# Patient Record
Sex: FEMALE | Race: BLACK OR AFRICAN AMERICAN | NOT HISPANIC OR LATINO | URBAN - METROPOLITAN AREA
[De-identification: names, ages, dates, MRNs, and addresses within clinical notes are randomized per-mention and may not be internally consistent; named-entity substitution may affect disease eponyms.]

---

## 2023-12-06 ENCOUNTER — EMERGENCY (EMERGENCY)
Facility: HOSPITAL | Age: 78
LOS: 1 days | Discharge: HOME | End: 2023-12-06
Attending: EMERGENCY MEDICINE | Admitting: EMERGENCY MEDICINE
Payer: MEDICAID

## 2023-12-06 VITALS
SYSTOLIC BLOOD PRESSURE: 154 MMHG | TEMPERATURE: 98 F | DIASTOLIC BLOOD PRESSURE: 81 MMHG | HEART RATE: 60 BPM | RESPIRATION RATE: 15 BRPM | OXYGEN SATURATION: 100 %

## 2023-12-06 VITALS
RESPIRATION RATE: 16 BRPM | SYSTOLIC BLOOD PRESSURE: 132 MMHG | HEART RATE: 77 BPM | TEMPERATURE: 98 F | OXYGEN SATURATION: 100 % | DIASTOLIC BLOOD PRESSURE: 77 MMHG

## 2023-12-06 LAB
ALBUMIN SERPL ELPH-MCNC: 4.2 G/DL — SIGNIFICANT CHANGE UP (ref 3.3–5)
ALBUMIN SERPL ELPH-MCNC: 4.2 G/DL — SIGNIFICANT CHANGE UP (ref 3.3–5)
ALP SERPL-CCNC: 67 U/L — SIGNIFICANT CHANGE UP (ref 40–120)
ALP SERPL-CCNC: 67 U/L — SIGNIFICANT CHANGE UP (ref 40–120)
ALT FLD-CCNC: 23 U/L — SIGNIFICANT CHANGE UP (ref 4–33)
ALT FLD-CCNC: 23 U/L — SIGNIFICANT CHANGE UP (ref 4–33)
ANION GAP SERPL CALC-SCNC: 9 MMOL/L — SIGNIFICANT CHANGE UP (ref 7–14)
ANION GAP SERPL CALC-SCNC: 9 MMOL/L — SIGNIFICANT CHANGE UP (ref 7–14)
AST SERPL-CCNC: 20 U/L — SIGNIFICANT CHANGE UP (ref 4–32)
AST SERPL-CCNC: 20 U/L — SIGNIFICANT CHANGE UP (ref 4–32)
BASOPHILS # BLD AUTO: 0.01 K/UL — SIGNIFICANT CHANGE UP (ref 0–0.2)
BASOPHILS # BLD AUTO: 0.01 K/UL — SIGNIFICANT CHANGE UP (ref 0–0.2)
BASOPHILS NFR BLD AUTO: 0.2 % — SIGNIFICANT CHANGE UP (ref 0–2)
BASOPHILS NFR BLD AUTO: 0.2 % — SIGNIFICANT CHANGE UP (ref 0–2)
BILIRUB SERPL-MCNC: 0.3 MG/DL — SIGNIFICANT CHANGE UP (ref 0.2–1.2)
BILIRUB SERPL-MCNC: 0.3 MG/DL — SIGNIFICANT CHANGE UP (ref 0.2–1.2)
BUN SERPL-MCNC: 8 MG/DL — SIGNIFICANT CHANGE UP (ref 7–23)
BUN SERPL-MCNC: 8 MG/DL — SIGNIFICANT CHANGE UP (ref 7–23)
CALCIUM SERPL-MCNC: 9.2 MG/DL — SIGNIFICANT CHANGE UP (ref 8.4–10.5)
CALCIUM SERPL-MCNC: 9.2 MG/DL — SIGNIFICANT CHANGE UP (ref 8.4–10.5)
CHLORIDE SERPL-SCNC: 107 MMOL/L — SIGNIFICANT CHANGE UP (ref 98–107)
CHLORIDE SERPL-SCNC: 107 MMOL/L — SIGNIFICANT CHANGE UP (ref 98–107)
CO2 SERPL-SCNC: 27 MMOL/L — SIGNIFICANT CHANGE UP (ref 22–31)
CO2 SERPL-SCNC: 27 MMOL/L — SIGNIFICANT CHANGE UP (ref 22–31)
CREAT SERPL-MCNC: 0.65 MG/DL — SIGNIFICANT CHANGE UP (ref 0.5–1.3)
CREAT SERPL-MCNC: 0.65 MG/DL — SIGNIFICANT CHANGE UP (ref 0.5–1.3)
EGFR: 90 ML/MIN/1.73M2 — SIGNIFICANT CHANGE UP
EGFR: 90 ML/MIN/1.73M2 — SIGNIFICANT CHANGE UP
EOSINOPHIL # BLD AUTO: 0.06 K/UL — SIGNIFICANT CHANGE UP (ref 0–0.5)
EOSINOPHIL # BLD AUTO: 0.06 K/UL — SIGNIFICANT CHANGE UP (ref 0–0.5)
EOSINOPHIL NFR BLD AUTO: 1.3 % — SIGNIFICANT CHANGE UP (ref 0–6)
EOSINOPHIL NFR BLD AUTO: 1.3 % — SIGNIFICANT CHANGE UP (ref 0–6)
GLUCOSE SERPL-MCNC: 93 MG/DL — SIGNIFICANT CHANGE UP (ref 70–99)
GLUCOSE SERPL-MCNC: 93 MG/DL — SIGNIFICANT CHANGE UP (ref 70–99)
HCT VFR BLD CALC: 36.8 % — SIGNIFICANT CHANGE UP (ref 34.5–45)
HCT VFR BLD CALC: 36.8 % — SIGNIFICANT CHANGE UP (ref 34.5–45)
HGB BLD-MCNC: 11 G/DL — LOW (ref 11.5–15.5)
HGB BLD-MCNC: 11 G/DL — LOW (ref 11.5–15.5)
IANC: 2.58 K/UL — SIGNIFICANT CHANGE UP (ref 1.8–7.4)
IANC: 2.58 K/UL — SIGNIFICANT CHANGE UP (ref 1.8–7.4)
IMM GRANULOCYTES NFR BLD AUTO: 0.4 % — SIGNIFICANT CHANGE UP (ref 0–0.9)
IMM GRANULOCYTES NFR BLD AUTO: 0.4 % — SIGNIFICANT CHANGE UP (ref 0–0.9)
LYMPHOCYTES # BLD AUTO: 1.72 K/UL — SIGNIFICANT CHANGE UP (ref 1–3.3)
LYMPHOCYTES # BLD AUTO: 1.72 K/UL — SIGNIFICANT CHANGE UP (ref 1–3.3)
LYMPHOCYTES # BLD AUTO: 36.8 % — SIGNIFICANT CHANGE UP (ref 13–44)
LYMPHOCYTES # BLD AUTO: 36.8 % — SIGNIFICANT CHANGE UP (ref 13–44)
MCHC RBC-ENTMCNC: 22.9 PG — LOW (ref 27–34)
MCHC RBC-ENTMCNC: 22.9 PG — LOW (ref 27–34)
MCHC RBC-ENTMCNC: 29.9 GM/DL — LOW (ref 32–36)
MCHC RBC-ENTMCNC: 29.9 GM/DL — LOW (ref 32–36)
MCV RBC AUTO: 76.5 FL — LOW (ref 80–100)
MCV RBC AUTO: 76.5 FL — LOW (ref 80–100)
MONOCYTES # BLD AUTO: 0.28 K/UL — SIGNIFICANT CHANGE UP (ref 0–0.9)
MONOCYTES # BLD AUTO: 0.28 K/UL — SIGNIFICANT CHANGE UP (ref 0–0.9)
MONOCYTES NFR BLD AUTO: 6 % — SIGNIFICANT CHANGE UP (ref 2–14)
MONOCYTES NFR BLD AUTO: 6 % — SIGNIFICANT CHANGE UP (ref 2–14)
NEUTROPHILS # BLD AUTO: 2.58 K/UL — SIGNIFICANT CHANGE UP (ref 1.8–7.4)
NEUTROPHILS # BLD AUTO: 2.58 K/UL — SIGNIFICANT CHANGE UP (ref 1.8–7.4)
NEUTROPHILS NFR BLD AUTO: 55.3 % — SIGNIFICANT CHANGE UP (ref 43–77)
NEUTROPHILS NFR BLD AUTO: 55.3 % — SIGNIFICANT CHANGE UP (ref 43–77)
NRBC # BLD: 0 /100 WBCS — SIGNIFICANT CHANGE UP (ref 0–0)
NRBC # BLD: 0 /100 WBCS — SIGNIFICANT CHANGE UP (ref 0–0)
NRBC # FLD: 0 K/UL — SIGNIFICANT CHANGE UP (ref 0–0)
NRBC # FLD: 0 K/UL — SIGNIFICANT CHANGE UP (ref 0–0)
PLATELET # BLD AUTO: 216 K/UL — SIGNIFICANT CHANGE UP (ref 150–400)
PLATELET # BLD AUTO: 216 K/UL — SIGNIFICANT CHANGE UP (ref 150–400)
POTASSIUM SERPL-MCNC: 4.2 MMOL/L — SIGNIFICANT CHANGE UP (ref 3.5–5.3)
POTASSIUM SERPL-MCNC: 4.2 MMOL/L — SIGNIFICANT CHANGE UP (ref 3.5–5.3)
POTASSIUM SERPL-SCNC: 4.2 MMOL/L — SIGNIFICANT CHANGE UP (ref 3.5–5.3)
POTASSIUM SERPL-SCNC: 4.2 MMOL/L — SIGNIFICANT CHANGE UP (ref 3.5–5.3)
PROT SERPL-MCNC: 7 G/DL — SIGNIFICANT CHANGE UP (ref 6–8.3)
PROT SERPL-MCNC: 7 G/DL — SIGNIFICANT CHANGE UP (ref 6–8.3)
RBC # BLD: 4.81 M/UL — SIGNIFICANT CHANGE UP (ref 3.8–5.2)
RBC # BLD: 4.81 M/UL — SIGNIFICANT CHANGE UP (ref 3.8–5.2)
RBC # FLD: 14.7 % — HIGH (ref 10.3–14.5)
RBC # FLD: 14.7 % — HIGH (ref 10.3–14.5)
SODIUM SERPL-SCNC: 143 MMOL/L — SIGNIFICANT CHANGE UP (ref 135–145)
SODIUM SERPL-SCNC: 143 MMOL/L — SIGNIFICANT CHANGE UP (ref 135–145)
TSH SERPL-MCNC: 0.71 UIU/ML — SIGNIFICANT CHANGE UP (ref 0.27–4.2)
TSH SERPL-MCNC: 0.71 UIU/ML — SIGNIFICANT CHANGE UP (ref 0.27–4.2)
WBC # BLD: 4.67 K/UL — SIGNIFICANT CHANGE UP (ref 3.8–10.5)
WBC # BLD: 4.67 K/UL — SIGNIFICANT CHANGE UP (ref 3.8–10.5)
WBC # FLD AUTO: 4.67 K/UL — SIGNIFICANT CHANGE UP (ref 3.8–10.5)
WBC # FLD AUTO: 4.67 K/UL — SIGNIFICANT CHANGE UP (ref 3.8–10.5)

## 2023-12-06 PROCEDURE — 71046 X-RAY EXAM CHEST 2 VIEWS: CPT | Mod: 26

## 2023-12-06 PROCEDURE — 93010 ELECTROCARDIOGRAM REPORT: CPT

## 2023-12-06 PROCEDURE — 99284 EMERGENCY DEPT VISIT MOD MDM: CPT

## 2023-12-06 RX ORDER — METOCLOPRAMIDE HCL 10 MG
10 TABLET ORAL ONCE
Refills: 0 | Status: COMPLETED | OUTPATIENT
Start: 2023-12-06 | End: 2023-12-06

## 2023-12-06 RX ORDER — MECLIZINE HCL 12.5 MG
1 TABLET ORAL
Qty: 20 | Refills: 0
Start: 2023-12-06 | End: 2023-12-10

## 2023-12-06 RX ADMIN — Medication 10 MILLIGRAM(S): at 16:31

## 2023-12-06 NOTE — ED ADULT NURSE NOTE - NSFALLRISKINTERV_ED_ALL_ED
Assistance OOB with selected safe patient handling equipment if applicable/Assistance with ambulation/Communicate fall risk and risk factors to all staff, patient, and family/Monitor gait and stability/Provide visual cue: yellow wristband, yellow gown, etc/Reinforce activity limits and safety measures with patient and family/Call bell, personal items and telephone in reach/Instruct patient to call for assistance before getting out of bed/chair/stretcher/Non-slip footwear applied when patient is off stretcher/[place] to call system/Physically safe environment - no spills, clutter or unnecessary equipment/Purposeful Proactive Rounding/Room/bathroom lighting operational, light cord in reach Assistance OOB with selected safe patient handling equipment if applicable/Assistance with ambulation/Communicate fall risk and risk factors to all staff, patient, and family/Monitor gait and stability/Provide visual cue: yellow wristband, yellow gown, etc/Reinforce activity limits and safety measures with patient and family/Call bell, personal items and telephone in reach/Instruct patient to call for assistance before getting out of bed/chair/stretcher/Non-slip footwear applied when patient is off stretcher/[place] to call system/Physically safe environment - no spills, clutter or unnecessary equipment/Purposeful Proactive Rounding/Room/bathroom lighting operational, light cord in reach

## 2023-12-06 NOTE — ED PROVIDER NOTE - OBJECTIVE STATEMENT
79 y/o with pmh of htn, anemia, hld presents for dizziness since 12am. daughter at bedside. Creole  used. Patient states last night she was sleeping, and around 12 started having back pain. She took ibuprofen and then started to feel dizzy. she checked her BP and saw it was 160/90s. She took her BP medication, but vomited right after. She had two episodes of vomit since onset of symptoms. Per patient's daughter, she is not compliant with her BP meds. She does states she typically vomits after taking her BP medication. She states she is unable to ambulate because she feels unbalanced, and the room is spinning. At this moment she is not endorsing any headache or nausea but is endorsing dizziness. 77 y/o with pmh of htn, anemia, hld presents for dizziness since 12am. daughter at bedside. Creole  used. Patient states last night she was sleeping, and around 12 started having back pain. She took ibuprofen and then started to feel dizzy. she checked her BP and saw it was 160/90s. She took her BP medication, but vomited right after. She had two episodes of vomit since onset of symptoms. Per patient's daughter, she is not compliant with her BP meds. She does states she typically vomits after taking her BP medication. She states she is unable to ambulate because she feels unbalanced, and the room is spinning. At this moment she is not endorsing any headache or nausea but is endorsing dizziness.

## 2023-12-06 NOTE — ED ADULT NURSE NOTE - OBJECTIVE STATEMENT
Patient presents to ED for dizziness, "room spinning." She is A&Ox4, in no acute distress, calm, cooperative. She states episode began last night and worsens when she goes from lying to standing. Per triage, evaluated for stroke and no stroke code called. Denies CP, SOB, dyspnea, fevers. Respirations even, unlabored on room air. History of HTN, HLD. 20G IV placed right AC. MD at bedside evaluating patient.

## 2023-12-06 NOTE — ED PROVIDER NOTE - PHYSICAL EXAMINATION
Physical Exam:  Gen:  Well appearing, awake, alert, non-toxic appearing  Head: NCAT  HEENT: Normal conjunctiva, trachea midline, moist mucous membranes  Lung: CTAB, no respiratory distress, no wheezes/rhonchi/rales B/L, speaking in full sentences  CV: RRR, no murmurs, rubs or gallops  Abd: Soft, non-tender, non-distended, no guarding, rigidity, rebound tenderness, or CVA tenderness   MSK: No visible deformities, moves all four extremities, no muscle or joint tenderness  Neuro: No focal motor deficits  Skin: Warm, well perfused, no visible rashes, no leg swelling  Psych: appropriate affect and mood Physical Exam:  Gen:  Well appearing, awake, alert, non-toxic appearing  Head: NCAT  HEENT: cn4-12 intact. on cranial nerve 3 exam, there is left lateral gaze.   Lung: CTAB, no respiratory distress, no wheezes/rhonchi/rales B/L, speaking in full sentences  CV: RRR, no murmurs, rubs or gallops  Abd: Soft, non-tender, non-distended, no guarding, rigidity, rebound tenderness, or CVA tenderness   MSK: No visible deformities, moves all four extremities, no muscle or joint tenderness  Neuro: No focal motor deficits  Skin: Warm, well perfused, no visible rashes, no leg swelling  Psych: appropriate affect and mood

## 2023-12-06 NOTE — ED ADULT NURSE NOTE - NS_SISCREENINGSR_GEN_ALL_ED
Patient is requesting a trop test and diabetes check .     [facility]      Negative

## 2023-12-06 NOTE — ED ADULT TRIAGE NOTE - CHIEF COMPLAINT QUOTE
pt aox4, ambulatory with stand by assist, creole speaking- daughter Alexandria, cecelia, comes in for sudden onset dizziness at approx 1230am last night, no falls. no other neuro deficits on exam. denies headache, no vision changes, no chest pain, denies recent illness. Hx: HTN, HLD pt aox4, ambulatory with stand by assist, creole speaking- daughter Alexandria, cecelia, comes in for sudden onset dizziness at approx 1230am last night, no falls. no other neuro deficits on exam. denies headache, no vision changes, no chest pain, denies recent illness. Hx: HTN, HLD  no code stroke per Dr. Rose

## 2023-12-06 NOTE — ED PROVIDER NOTE - PATIENT PORTAL LINK FT
You can access the FollowMyHealth Patient Portal offered by [facility] by registering at the following website: [email or web address]. By joining [employer]’s FollowMyHealth portal, you will also be able to view your health information using other applications (apps) compatible with our system. You can access the FollowMyHealth Patient Portal offered by [facility] by registering at the following website: [email or web address]. By joining [employer]’s FollowMyHealth portal, you will also be able to view your health information using other applications (apps) compatible with our system.

## 2023-12-06 NOTE — ED PROVIDER NOTE - ATTENDING CONTRIBUTION TO CARE
78 female to ED with daughter complaining of episodes of dizziness since last night.  No recent illness, no fever/chills, no cough/sore throat.  Denies associated palpitations, no CP/SOB/diaphoresis.  Had episode of nausea and vomiting but states this occurred after taking BP meds which occasionally do make her vomit and has had this in the past.  Patient lay down after onset to rest but when got up felt that the room was spinning and had to go back to bed.  Because symptoms persisted through the morning daughter decided to go to the emergency department, now arrives in evening.  No previous similar complaints in the past.  Patient states feels well lying flat but when he gets up "dizziness is still there."  Denies associated change in voice, no weakness/numbness, had short-lived vision complaint with left gaze.  MMM, clear lungs, heart reg, abd soft, NT to palp, no kristin/guarding, no CVAT, no edema, NT calves, +Svetlana Hallpike, reproducing sx, +extinguishing, CN 3-12 intact.

## 2023-12-06 NOTE — ED PROVIDER NOTE - NSFOLLOWUPINSTRUCTIONS_ED_ALL_ED_FT
You have been evaluated in the Emergency Department today for dizziness. Your evaluation suggests that your symptoms are most likely due to peripheral vertigo.    You have been prescribed meclizine to help relieve your symptoms. Please take your prescription as directed.     Please follow up with your primary care doctor in 2-3 days.    Please follow up with Neurology if your symptoms persist.    Return to the ER immediately for worsening or uncontrolled symptoms, worsening headache, chest pain, shortness of breath, persistent vomiting, vision changes, fainting, or for any other concerning symptoms.

## 2023-12-06 NOTE — ED ADULT NURSE NOTE - CHIEF COMPLAINT QUOTE
pt aox4, ambulatory with stand by assist, creole speaking- daughter Alexandria, cecelia, comes in for sudden onset dizziness at approx 1230am last night, no falls. no other neuro deficits on exam. denies headache, no vision changes, no chest pain, denies recent illness. Hx: HTN, HLD  no code stroke per Dr. Rose

## 2023-12-06 NOTE — ED PROVIDER NOTE - CLINICAL SUMMARY MEDICAL DECISION MAKING FREE TEXT BOX
79 y/o with pmh of htn, anemia, hld presents for dizziness since 12am. daughter at bedside. Creole  used. Patient states last night she was sleeping, and around 12 started having back pain. She took ibuprofen and then started to feel dizzy. she checked her BP and saw it was 160/90s. She took her BP medication, but vomited right after. She had two episodes of vomit since onset of symptoms. Per patient's daughter, she is not compliant with her BP meds. She does states she typically vomits after taking her BP medication. She states she is unable to ambulate because she feels unbalanced, and the room is spinning. At this moment she is not endorsing any headache or nausea but is endorsing dizziness.    concern for stroke vs hemorrhage- Ct to evaluate 77 y/o with pmh of htn, anemia, hld presents for dizziness since 12am. daughter at bedside. Creole  used. Patient states last night she was sleeping, and around 12 started having back pain. She took ibuprofen and then started to feel dizzy. she checked her BP and saw it was 160/90s. She took her BP medication, but vomited right after. She had two episodes of vomit since onset of symptoms. Per patient's daughter, she is not compliant with her BP meds. She does states she typically vomits after taking her BP medication. She states she is unable to ambulate because she feels unbalanced, and the room is spinning. At this moment she is not endorsing any headache or nausea but is endorsing dizziness.    concern for stroke vs hemorrhage- Ct to evaluate 77 y/o with pmh of htn, anemia, hld presents for dizziness since 12am. daughter at bedside. Creole  used. Patient states last night she was sleeping, and around 12 started having back pain. She took ibuprofen and then started to feel dizzy. she checked her BP and saw it was 160/90s. She took her BP medication, but vomited right after. She had two episodes of vomit since onset of symptoms. Per patient's daughter, she is not compliant with her BP meds. She does states she typically vomits after taking her BP medication. She states she is unable to ambulate because she feels unbalanced, and the room is spinning. At this moment she is not endorsing any headache or nausea but is endorsing dizziness. 79 y/o with pmh of htn, anemia, hld presents for dizziness since 12am. daughter at bedside. Creole  used. Patient states last night she was sleeping, and around 12 started having back pain. She took ibuprofen and then started to feel dizzy. she checked her BP and saw it was 160/90s. She took her BP medication, but vomited right after. She had two episodes of vomit since onset of symptoms. Per patient's daughter, she is not compliant with her BP meds. She does states she typically vomits after taking her BP medication. She states she is unable to ambulate because she feels unbalanced, and the room is spinning. At this moment she is not endorsing any headache or nausea but is endorsing dizziness. 77 y/o with pmh of htn, anemia, hld presents for dizziness since 12am. daughter at bedside. Creole  used. Patient states last night she was sleeping, and around 12 started having back pain. She took ibuprofen and then started to feel dizzy. she checked her BP and saw it was 160/90s. She took her BP medication, but vomited right after. She had two episodes of vomited since onset of symptoms. Per patient's daughter, she is not compliant with her BP meds and she does states she typically vomits after taking her BP medication. She states she is unable to ambulate because she feels unbalanced, and the room is spinning. At this moment she is not endorsing any headache or nausea but is endorsing dizziness.  Dizziness is positional in nature. On PE, left lateral nystagmus.    history and physical most consistent with vertigo. will treat symptoms with reglan and send meclizine to pharmacy. I suspect patient will be discharged. 79 y/o with pmh of htn, anemia, hld presents for dizziness since 12am. daughter at bedside. Creole  used. Patient states last night she was sleeping, and around 12 started having back pain. She took ibuprofen and then started to feel dizzy. she checked her BP and saw it was 160/90s. She took her BP medication, but vomited right after. She had two episodes of vomited since onset of symptoms. Per patient's daughter, she is not compliant with her BP meds and she does states she typically vomits after taking her BP medication. She states she is unable to ambulate because she feels unbalanced, and the room is spinning. At this moment she is not endorsing any headache or nausea but is endorsing dizziness.  Dizziness is positional in nature. On PE, left lateral nystagmus.    history and physical most consistent with vertigo. will treat symptoms with reglan and send meclizine to pharmacy. I suspect patient will be discharged.

## 2023-12-06 NOTE — ED PROVIDER NOTE - NSFOLLOWUPCLINICS_GEN_ALL_ED_FT
[facility]  Neurology  [address] [place] [address] [address] [place] [address]  Phone: [phone]  Fax:      [facility]  Neurology  [address] [place] [address] [address] [place] [address]  Phone: [phone]  Fax:

## 2024-07-17 NOTE — ED PROVIDER NOTE - PROGRESS NOTE DETAILS
Patient education provided.  Esther Mane PGY1: re-assessed patient. she is resting comfortably in bed. Not feeling dizzy at this time. Esther Mane PGY1: patient is able to ambulate without feeling dizzy. Discussed results of workup, importance of follow-up with Neurologist if symptoms persist, and return precautions with patient who verbalized understanding and agreement. Pt had opportunity to ask questions and address concerns, and results of workup including lab, chest xray were reviewed and provided in DC paperwork. Patient is medically clear for DC at this time.